# Patient Record
(demographics unavailable — no encounter records)

---

## 2025-03-12 NOTE — DISCUSSION/SUMMARY
[Low acute suicide risk] : Low acute suicide risk [No] : No [Not clinically indicated] : Safety Plan completed/updated (for individuals at risk): Not clinically indicated [FreeTextEntry1] : Risk factors:  psychosocial stressors, parents , h/o abuse/trauma/losses, not in treatment, not compliant with treatment, family history of anxiety   Protective factors: female gender, age, domiciled with supportive family, engaged in school, no prior SA or SIB, no current si/i/p, no h/o violence, no current hi/i/p, help-seeking, motivated for outpatient treatment, future oriented with short and long term goals, barriers to suicide, no access to guns,  not acutely manic or psychotic, no substance abuse, no trauma hx, no family history of suicide

## 2025-03-12 NOTE — RISK ASSESSMENT
[Clinical Interview] : Clinical Interview [Collateral Sources] : Collateral Sources [No] : No [No known suicide factors] : No known suicide factors [Non-compliant or not receiving treatment] : non-compliant or not receiving treatment [Identifies reasons for living] : identifies reasons for living [Supportive social network of family or friends] : supportive social network of family or friends [Engaged in work or school] : engaged in work or school [None in the patient's lifetime] : None in the patient's lifetime [None Known] : none known [No known risk factors] : No known risk factors [Residential stability] : residential stability [Relationship stability] : relationship stability [Sobriety] : sobriety [Yes] : yes [Yes, within past 3 months] : yes, within past 3 months [FreeTextEntry3] : parents divorce  [FreeTextEntry4] : recently hit and bit mom.  [de-identified] : gun at dad's house is in a safe and pt has no access. dad is in law enforcement.

## 2025-03-12 NOTE — HISTORY OF PRESENT ILLNESS
[FreeTextEntry1] : pt is a 8yo girl, parents , split custody, 3rd grader at [employer], in gen ed, IEP with speech therapy/OT/Resource room, no formal pphx including no inpatient admissions, no SA, no sib, no outpatient tx, no violence hx, no legal hx, no cps involvement, BIB parents, referred by school for connection to care for behavioral issues at home in the context of stressors of divorce.   pt was able to easily separate from parents to come into the office and then was able to transition easily into the playroom after the interview.  pt states that they have a good relationship with their parents. parents have been  for years and pt spends weekdays with mom and weekends with dad. pt likes that she is now living alone with mom (sans grandparents) and that they have a cat "Frenchfry." pt like when dad's girlfriend "Karla" and her 5yo daughter and 1yo son stay over. She does find transitions between households difficult and will cry and roll on the floor and yell after the transitions when she feels sad and misses the other parent. pt reports enjoying going to school, likes the teacher and has "lots" friends. pt denies being bullied. she states that she sometimes daydreams in class, and it is a little difficult for her to focus. she also sometimes talks to her friends during class. pt does not always finish classwork in the allotted time. pt denies any trouble sitting down at desk while in class, denies calling out, denies frequently losing things, and can wait their turn in line. pt states that they can understand the work and do not have trouble completing assignments. pt denies any significant worry. they can separate from parents easily when going to school. pt denies fears of something terrible happening to themselves or their parents. pt said that her only fear is monsters, so she wants to sleep with mom at night. they state that they do not have anxiety when talking/interacting with peers in social settings. pt states that they are usually happy. they deny excessively sad moods. they do not report any thoughts of not wanting to be alive. pt denies aggressive thoughts, feels safe at home and at school, and denied any h/o abuse. pt states wants to be an eye doctor or teacher when they grow up. 3 wishes include: getting a new cat (have been looking together), for  cat, Olga, to "come back," and for parents to spend more time together.   DNP obtained collateral from parents, and then attending physician met directly with parents.              Pt's mom and dad both are present for today's office visit for concerns of pt's behavioral problems at home. Mom and dad has been going through a divorce process for 5 years and pt is currently living in two different households. Their separation was amicable and that co-parenting between them is not an issue. Mom states that two weeks ago, pt had an "explosion" where she was screaming 6-7 hours, kicking, biting and pushing towards mom and grandmother. Mother was unable to de-escalate that she called dad to come over. Pt has had frequent tantrums at home, but this time it was very significant due to the intensity and duration of her yelling and becoming physically aggressive. This was the first time that this happened but prior to that she would have frequent tantrums where she would cry but she was able to calm down. At school, parents report no concerns with any significant behavioral issues, good interactions with teachers and peers, although teachers find she becomes restless in class and sometimes benefits from a stretchy band underneath her desk, so she is able to fidget with her feet. She currently has a meeting on Monday for evaluation for OT, teachers noticed that she has difficulty holding and writing with her pencil, holds it with too much pressure. At home, pt can adjust to both households, dad lives in [place] and mom lives in [place], parents try to apply same set of rules so there is consistency. Recently dad introduced pt to his gf, and within the last two weeks have been sleeping over each other's homes and possibly have been affecting her sleep. Pt is used to sleeping with her parents at both houses, but dad has been trying to get her to sleep alone. He admits that he will still sometimes fall asleep in her bed while trying to get her to bed, however pt is still sleeping in mother's bed and not in her room. They want to be sure that pt will be able to sleep alone when significant others sleep over. Generally, patient already has issues with sleep and would wake up at 3-4am. Mom and dad is concerned that their relationships outside their family is affecting her, despite positive interactions with the new girlfriend and her kids which may be contributing to her frequent tantrums. Parent emphasized that they are family-oriented and wants to include everyone in their family dynamics.  Parents report possible anxiety due to pt not wanting to sleep alone and not wanting to go to parts of the home alone. They say that she is afraid of monsters. they deny and previous h/o separation anxiety. no h/o social anxiety. no panic attacks.   Parent denies any concerns for depression. Pt eats well and they do not note concerns for inattention. They deny that pt has ever engaged in self-harm or voiced any SI. Pt has only been aggressive toward mother recently and has no h/o aggression toward peers. Father is in law enforcement, has access to firearms, it is currently in a safe at his house, where pt does not have access to it. Family denies any acute safety issues.  in terms of past hx:  Mother had no complications with pregnancy, delivered full-term. hx of speech therapy at the start of  but otherwise normal developmental milestones. Mom has history of AGNES; dad reports no family history.  [FreeTextEntry2] : none  [FreeTextEntry3] : none

## 2025-03-12 NOTE — PLAN
[Provision of National Suicide Prevention Lifeline 0-128-096-TALK (2187)] : Provision of national suicide prevention lifeline 3-258-911-talk (5734) [Patient] : patient [Family] : family [Education provided regarding environmental safety/ lethal means restriction] : Education provided regarding environmental safety/ lethal means restriction [None on Record] : none on record [TextBox_9] : individual therapy with parent training  [TextBox_11] : not currently indicated  [TextBox_13] :  no safety concerns at this time. no guns at home. family should call 911 or go to nearest ER or any acute safety concerns. [TextBox_26] : referred by school on behalf of parents. parents declined school consent

## 2025-03-12 NOTE — RISK ASSESSMENT
[Clinical Interview] : Clinical Interview [Collateral Sources] : Collateral Sources [No] : No [No known suicide factors] : No known suicide factors [Non-compliant or not receiving treatment] : non-compliant or not receiving treatment [Identifies reasons for living] : identifies reasons for living [Supportive social network of family or friends] : supportive social network of family or friends [Engaged in work or school] : engaged in work or school [None in the patient's lifetime] : None in the patient's lifetime [None Known] : none known [No known risk factors] : No known risk factors [Residential stability] : residential stability [Relationship stability] : relationship stability [Sobriety] : sobriety [Yes] : yes [Yes, within past 3 months] : yes, within past 3 months [FreeTextEntry3] : parents divorce  [FreeTextEntry4] : recently hit and bit mom.  [de-identified] : gun at dad's house is in a safe and pt has no access. dad is in law enforcement.

## 2025-03-12 NOTE — PLAN
[Provision of National Suicide Prevention Lifeline 3-844-759-TALK (6992)] : Provision of national suicide prevention lifeline 1-795-093-talk (2291) [Patient] : patient [Family] : family [Education provided regarding environmental safety/ lethal means restriction] : Education provided regarding environmental safety/ lethal means restriction [None on Record] : none on record [TextBox_9] : individual therapy with parent training  [TextBox_11] : not currently indicated  [TextBox_13] :  no safety concerns at this time. no guns at home. family should call 911 or go to nearest ER or any acute safety concerns. [TextBox_26] : referred by school on behalf of parents. parents declined school consent

## 2025-03-12 NOTE — HISTORY OF PRESENT ILLNESS
[FreeTextEntry1] : pt is a 8yo girl, parents , split custody, 3rd grader at [employer], in gen ed, IEP with speech therapy/OT/Resource room, no formal pphx including no inpatient admissions, no SA, no sib, no outpatient tx, no violence hx, no legal hx, no cps involvement, BIB parents, referred by school for connection to care for behavioral issues at home in the context of stressors of divorce.   pt was able to easily separate from parents to come into the office and then was able to transition easily into the playroom after the interview.  pt states that they have a good relationship with their parents. parents have been  for years and pt spends weekdays with mom and weekends with dad. pt likes that she is now living alone with mom (sans grandparents) and that they have a cat "Frenchfry." pt like when dad's girlfriend "Karla" and her 3yo daughter and 3yo son stay over. She does find transitions between households difficult and will cry and roll on the floor and yell after the transitions when she feels sad and misses the other parent. pt reports enjoying going to school, likes the teacher and has "lots" friends. pt denies being bullied. she states that she sometimes daydreams in class, and it is a little difficult for her to focus. she also sometimes talks to her friends during class. pt does not always finish classwork in the allotted time. pt denies any trouble sitting down at desk while in class, denies calling out, denies frequently losing things, and can wait their turn in line. pt states that they can understand the work and do not have trouble completing assignments. pt denies any significant worry. they can separate from parents easily when going to school. pt denies fears of something terrible happening to themselves or their parents. pt said that her only fear is monsters, so she wants to sleep with mom at night. they state that they do not have anxiety when talking/interacting with peers in social settings. pt states that they are usually happy. they deny excessively sad moods. they do not report any thoughts of not wanting to be alive. pt denies aggressive thoughts, feels safe at home and at school, and denied any h/o abuse. pt states wants to be an eye doctor or teacher when they grow up. 3 wishes include: getting a new cat (have been looking together), for  cat, Olga, to "come back," and for parents to spend more time together.   DNP obtained collateral from parents, and then attending physician met directly with parents.              Pt's mom and dad both are present for today's office visit for concerns of pt's behavioral problems at home. Mom and dad has been going through a divorce process for 5 years and pt is currently living in two different households. Their separation was amicable and that co-parenting between them is not an issue. Mom states that two weeks ago, pt had an "explosion" where she was screaming 6-7 hours, kicking, biting and pushing towards mom and grandmother. Mother was unable to de-escalate that she called dad to come over. Pt has had frequent tantrums at home, but this time it was very significant due to the intensity and duration of her yelling and becoming physically aggressive. This was the first time that this happened but prior to that she would have frequent tantrums where she would cry but she was able to calm down. At school, parents report no concerns with any significant behavioral issues, good interactions with teachers and peers, although teachers find she becomes restless in class and sometimes benefits from a stretchy band underneath her desk, so she is able to fidget with her feet. She currently has a meeting on Monday for evaluation for OT, teachers noticed that she has difficulty holding and writing with her pencil, holds it with too much pressure. At home, pt can adjust to both households, dad lives in [place] and mom lives in [place], parents try to apply same set of rules so there is consistency. Recently dad introduced pt to his gf, and within the last two weeks have been sleeping over each other's homes and possibly have been affecting her sleep. Pt is used to sleeping with her parents at both houses, but dad has been trying to get her to sleep alone. He admits that he will still sometimes fall asleep in her bed while trying to get her to bed, however pt is still sleeping in mother's bed and not in her room. They want to be sure that pt will be able to sleep alone when significant others sleep over. Generally, patient already has issues with sleep and would wake up at 3-4am. Mom and dad is concerned that their relationships outside their family is affecting her, despite positive interactions with the new girlfriend and her kids which may be contributing to her frequent tantrums. Parent emphasized that they are family-oriented and wants to include everyone in their family dynamics.  Parents report possible anxiety due to pt not wanting to sleep alone and not wanting to go to parts of the home alone. They say that she is afraid of monsters. they deny and previous h/o separation anxiety. no h/o social anxiety. no panic attacks.   Parent denies any concerns for depression. Pt eats well and they do not note concerns for inattention. They deny that pt has ever engaged in self-harm or voiced any SI. Pt has only been aggressive toward mother recently and has no h/o aggression toward peers. Father is in law enforcement, has access to firearms, it is currently in a safe at his house, where pt does not have access to it. Family denies any acute safety issues.  in terms of past hx:  Mother had no complications with pregnancy, delivered full-term. hx of speech therapy at the start of  but otherwise normal developmental milestones. Mom has history of AGNES; dad reports no family history.  [FreeTextEntry2] : none  [FreeTextEntry3] : none

## 2025-03-12 NOTE — REASON FOR VISIT
[Behavioral Health Urgent Care Assessment] : a behavioral health urgent care assessment [School] : school [Patient] : patient [Self] : alone [Parents] : with parents [TextBox_17] : connection to care for emotional dysregulation at home